# Patient Record
Sex: MALE | Race: WHITE | NOT HISPANIC OR LATINO | Employment: FULL TIME | URBAN - METROPOLITAN AREA
[De-identification: names, ages, dates, MRNs, and addresses within clinical notes are randomized per-mention and may not be internally consistent; named-entity substitution may affect disease eponyms.]

---

## 2021-02-26 ENCOUNTER — TELEPHONE (OUTPATIENT)
Dept: NEPHROLOGY | Facility: CLINIC | Age: 81
End: 2021-02-26

## 2021-04-01 ENCOUNTER — TELEPHONE (OUTPATIENT)
Dept: NEPHROLOGY | Facility: CLINIC | Age: 81
End: 2021-04-01

## 2021-10-14 ENCOUNTER — TELEPHONE (OUTPATIENT)
Dept: VASCULAR SURGERY | Facility: CLINIC | Age: 81
End: 2021-10-14

## 2022-02-23 ENCOUNTER — TELEPHONE (OUTPATIENT)
Dept: HEPATOLOGY | Facility: CLINIC | Age: 82
End: 2022-02-23
Payer: MEDICARE

## 2022-02-23 ENCOUNTER — OFFICE VISIT (OUTPATIENT)
Dept: HEPATOLOGY | Facility: CLINIC | Age: 82
End: 2022-02-23
Payer: MEDICARE

## 2022-02-23 VITALS
TEMPERATURE: 96 F | SYSTOLIC BLOOD PRESSURE: 170 MMHG | WEIGHT: 214.06 LBS | OXYGEN SATURATION: 96 % | DIASTOLIC BLOOD PRESSURE: 74 MMHG | HEART RATE: 68 BPM | RESPIRATION RATE: 18 BRPM | HEIGHT: 69 IN | BODY MASS INDEX: 31.71 KG/M2

## 2022-02-23 DIAGNOSIS — I35.0 SEVERE AORTIC STENOSIS: ICD-10-CM

## 2022-02-23 DIAGNOSIS — I25.10 CORONARY ARTERY DISEASE INVOLVING NATIVE CORONARY ARTERY OF NATIVE HEART WITHOUT ANGINA PECTORIS: ICD-10-CM

## 2022-02-23 DIAGNOSIS — I50.22 CHRONIC SYSTOLIC CONGESTIVE HEART FAILURE: ICD-10-CM

## 2022-02-23 DIAGNOSIS — I27.20 PULMONARY HYPERTENSION: ICD-10-CM

## 2022-02-23 DIAGNOSIS — R18.8 OTHER ASCITES: Primary | ICD-10-CM

## 2022-02-23 PROCEDURE — 99214 OFFICE O/P EST MOD 30 MIN: CPT | Mod: PBBFAC | Performed by: INTERNAL MEDICINE

## 2022-02-23 PROCEDURE — 99205 PR OFFICE/OUTPT VISIT, NEW, LEVL V, 60-74 MIN: ICD-10-PCS | Mod: S$PBB,,, | Performed by: INTERNAL MEDICINE

## 2022-02-23 PROCEDURE — 99999 PR PBB SHADOW E&M-EST. PATIENT-LVL IV: CPT | Mod: PBBFAC,,, | Performed by: INTERNAL MEDICINE

## 2022-02-23 PROCEDURE — 99999 PR PBB SHADOW E&M-EST. PATIENT-LVL IV: ICD-10-PCS | Mod: PBBFAC,,, | Performed by: INTERNAL MEDICINE

## 2022-02-23 PROCEDURE — 99205 OFFICE O/P NEW HI 60 MIN: CPT | Mod: S$PBB,,, | Performed by: INTERNAL MEDICINE

## 2022-02-23 RX ORDER — ERGOCALCIFEROL 1.25 MG/1
50000 CAPSULE ORAL
COMMUNITY
Start: 2022-02-21 | End: 2022-04-12

## 2022-02-23 RX ORDER — HYDROCODONE BITARTRATE AND ACETAMINOPHEN 5; 325 MG/1; MG/1
1 TABLET ORAL DAILY PRN
COMMUNITY
Start: 2022-02-22

## 2022-02-23 RX ORDER — CALCITRIOL 0.25 UG/1
CAPSULE ORAL
COMMUNITY
Start: 2022-02-21

## 2022-02-23 RX ORDER — SPIRONOLACTONE 25 MG/1
25 TABLET ORAL DAILY
COMMUNITY
Start: 2022-01-10

## 2022-02-23 NOTE — TELEPHONE ENCOUNTER
I spoke with patient and his wife.  Appt with Dr. Chu scheduled 2/28/22; appt reminder notice mailed.

## 2022-02-23 NOTE — TELEPHONE ENCOUNTER
----- Message from Marsha Evans sent at 2/23/2022  9:29 AM CST -----  Good morning,    The pt listed above is being referred by Dr. Ramón Whitt for ascites. Unfortunately, I was unable to schedule pts appt because no appts populated.I have scanned the referral and records into . Please contact Mr. King at your earliest convenience to schedule his appt.          Thank You,  Marsha Cates

## 2022-02-23 NOTE — PROGRESS NOTES
Subjective:       Patient ID: Jarod King is a 81 y.o. male.    Chief Complaint: No chief complaint on file.    HPI  I saw this 81 y.o. man who has a significant cardiac history (since 2013) and who developed ascites and edema in Nov 2021.  He has required paracentesis on a few occasions since then (about 4 liters of ascites each time).    He is on low dose diuretics and has been unable to tolerate higher doses because of worsening kidney function.  He has also noticed some muscle loss.    No episodes of hepatic encephalopathy.    I calculated his SAAG (serum to ascites albumin gradient) from the available labs he had a while back and although I couldn't find a fluid albumin and serum albumin done on the same day, it is likely that his SAAG is around 1.2 which suggests portal hypertension or a cardiac cause of his ascites.      Latest labs show hyponatremia, a creatinine of 1.82  Normal lFTs  Platelets 93    HBV and HCV neg    Overall feels well    Furosemide 40mg  [place] 25 mg daily  - couldn't tolerate a dose increase    PMH:  Congestive heart failure- LVEF 30-35%  Pulmonary hypertension- mean PAP 42 mm Hg  Severe aortic stenosis- significant gradient  CAD- diffuse coronary artery disease  ICD  Paroxysmal AF  COVID 2020- very ill    SH:  Non smoker  Alcohol rarely        Review of Systems   Constitutional: Negative for activity change, appetite change, chills, fatigue, fever and unexpected weight change.   HENT: Negative for hearing loss.    Eyes: Negative for discharge and visual disturbance.   Respiratory: Negative for cough, chest tightness, shortness of breath and wheezing.    Cardiovascular: Negative for chest pain, palpitations and leg swelling.   Gastrointestinal: Negative for abdominal distention, abdominal pain, constipation, diarrhea and nausea.   Genitourinary: Negative for dysuria and frequency.   Musculoskeletal: Negative for arthralgias and back pain.   Skin: Negative for pallor and  rash.   Neurological: Negative for dizziness, tremors, speech difficulty and headaches.   Hematological: Negative for adenopathy.   Psychiatric/Behavioral: Negative for agitation and confusion.         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     Chronic renal failure    Nonrheumatic aortic (valve) stenosis     Aortic valve disorders    Other hyperlipidemia     Hyperlipidemia    Paroxysmal atrial fibrillation     Atrial fibrillation    Pulmonary hypertension      Past Surgical History:   Procedure Laterality Date    EPIDURAL BLOCK INJECTION      INSERTION OF GENERATOR FOR SINGLE CHAMBER IMPLANTABLE CARDIOVERTER-DEFIBRILLATOR (ICD)      Biotronik    LITHOTRIPSY      [place]S  11/[place]    [facility]       Current Outpatient Medications   Medication Sig    amiodarone (PACERONE) 200 MG Tab Take 200 mg by mouth.    ELIQUIS 5 mg Tab     ergocalciferol (ERGOCALCIFEROL) 50,000 unit Cap Take 50,000 Units by mouth.    furosemide (LASIX) 40 MG tablet Take 40 mg by mouth.    hydrALAZINE (APRESOLINE) 50 MG tablet Take 50 mg by mouth 2 (two) times a day.    HYDROcodone-acetaminophen (NORCO) 5-325 mg per tablet Take 1 tablet by mouth daily as needed.    levothyroxine (SYNTHROID) 50 MCG tablet TAKE 1 TABLET BY MOUTH IN THE EARLY MORNING 30 - 60 MINUTES BEFORE BREAKFAST OR OTHER MEDS    losartan (COZAAR) 50 MG tablet     sildenafil (REVATIO) 20 mg Tab Take 1 tablet (20 mg total) by mouth 3 (three) times daily.    spironolactone (ALDACTONE) 25 MG tablet Take 25 mg by mouth once daily.    calcitRIOL (ROCALTROL) 0.25 MCG Cap      No current facility-administered medications for this visit.       Objective:      Physical Exam  HENT:      Head: Normocephalic.    Eyes:      Pupils: Pupils are equal, round, and reactive to light.   Neck:      Thyroid: No thyromegaly.   Cardiovascular:      Rate and Rhythm: Normal rate and regular rhythm.      Heart sounds: Normal heart sounds.   Pulmonary:      Effort: Pulmonary effort is normal.      Breath sounds: Normal breath sounds. No wheezing.   Abdominal:      Palpations: Abdomen is soft. There is no mass.      Tenderness: There is no abdominal tenderness.   Musculoskeletal:      Right lower leg: Edema present.      Left lower leg: Edema present.      Comments: Significant edema to both knees   Lymphadenopathy:      Cervical: No cervical adenopathy.   Skin:     General: Skin is warm.      Findings: No erythema or rash.   Neurological:      Mental Status: He is alert and oriented to person, place, and time.   Psychiatric:         Behavior: Behavior normal.           Assessment:       1. Other ascites    2. Chronic systolic congestive heart failure    3. Pulmonary hypertension    4. Severe aortic stenosis    5. Coronary artery disease involving native coronary artery of native heart without angina pectoris        Plan:   I suspect that the cause of his ascites is mainly cardiac disease sav right heart failure.  However, he does have low platelets and the chronicity of his cardiac disease makes it possible that he has also developed  Liver fibrosis from chronic congestion.    I explained to  and Mrs King that the treatment for his ascites is symptomatic and will likely consist of regular paracentesis and low dose diuretics..  I will proceed with a liver US to image his liver but the only way to make a definitve diagnosis would be with a liver biopsy which I did not propose since I do not think it would alter our management.    If advanced cardiac options are being considered, the cardiologists may wish to define his liver disease more accurately and at that point we can arrange a liver biopsy.    Clinic in 4 weeks.      Low salt  renny  Saw Dr Pearson but Dr Vanegas is his regular cardiologist

## 2022-02-23 NOTE — TELEPHONE ENCOUNTER
Patient's wife called back stating that he would like to come to clinic today.  Patient scheduled to see Dr. Kingsley today at 2:30 pm.  Appt with Dr. Chu cancelled on 2/28/22.

## 2022-03-02 ENCOUNTER — HOSPITAL ENCOUNTER (OUTPATIENT)
Dept: RADIOLOGY | Facility: HOSPITAL | Age: 82
Discharge: HOME | End: 2022-03-02
Attending: INTERNAL MEDICINE
Payer: MEDICARE

## 2022-03-02 DIAGNOSIS — R18.8 OTHER ASCITES: ICD-10-CM

## 2022-03-02 PROCEDURE — 93976 US LIVER WITH DOPPLER: ICD-10-PCS | Mod: 26,,, | Performed by: RADIOLOGY

## 2022-03-02 PROCEDURE — 76705 US LIVER WITH DOPPLER: ICD-10-PCS | Mod: 26,XS,, | Performed by: RADIOLOGY

## 2022-03-02 PROCEDURE — 76705 ECHO EXAM OF ABDOMEN: CPT | Mod: 26,XS,, | Performed by: RADIOLOGY

## 2022-03-02 PROCEDURE — 76705 ECHO EXAM OF ABDOMEN: CPT | Mod: TC,PO

## 2022-03-02 PROCEDURE — 93976 VASCULAR STUDY: CPT | Mod: TC,PO

## 2022-03-02 PROCEDURE — 93976 VASCULAR STUDY: CPT | Mod: 26,,, | Performed by: RADIOLOGY

## 2022-03-23 ENCOUNTER — OFFICE VISIT (OUTPATIENT)
Dept: HEPATOLOGY | Facility: CLINIC | Age: 82
End: 2022-03-23
Payer: MEDICARE

## 2022-03-23 VITALS
DIASTOLIC BLOOD PRESSURE: 75 MMHG | SYSTOLIC BLOOD PRESSURE: 157 MMHG | HEIGHT: 69 IN | TEMPERATURE: 99 F | WEIGHT: 205.5 LBS | BODY MASS INDEX: 30.44 KG/M2 | HEART RATE: 67 BPM | RESPIRATION RATE: 18 BRPM | OXYGEN SATURATION: 100 %

## 2022-03-23 DIAGNOSIS — I27.20 PULMONARY HYPERTENSION: ICD-10-CM

## 2022-03-23 DIAGNOSIS — I35.0 SEVERE AORTIC STENOSIS: Primary | ICD-10-CM

## 2022-03-23 DIAGNOSIS — I25.10 CORONARY ARTERY DISEASE INVOLVING NATIVE CORONARY ARTERY OF NATIVE HEART WITHOUT ANGINA PECTORIS: ICD-10-CM

## 2022-03-23 DIAGNOSIS — R18.8 OTHER ASCITES: ICD-10-CM

## 2022-03-23 PROCEDURE — 99215 OFFICE O/P EST HI 40 MIN: CPT | Mod: S$PBB,,, | Performed by: INTERNAL MEDICINE

## 2022-03-23 PROCEDURE — 99214 OFFICE O/P EST MOD 30 MIN: CPT | Mod: PBBFAC | Performed by: INTERNAL MEDICINE

## 2022-03-23 PROCEDURE — 99215 PR OFFICE/OUTPT VISIT, EST, LEVL V, 40-54 MIN: ICD-10-PCS | Mod: S$PBB,,, | Performed by: INTERNAL MEDICINE

## 2022-03-23 PROCEDURE — 99999 PR PBB SHADOW E&M-EST. PATIENT-LVL IV: ICD-10-PCS | Mod: PBBFAC,,, | Performed by: INTERNAL MEDICINE

## 2022-03-23 PROCEDURE — 99999 PR PBB SHADOW E&M-EST. PATIENT-LVL IV: CPT | Mod: PBBFAC,,, | Performed by: INTERNAL MEDICINE

## 2022-03-23 RX ORDER — METOPROLOL SUCCINATE 25 MG/1
12.5 TABLET, EXTENDED RELEASE ORAL
COMMUNITY

## 2022-03-23 NOTE — PROGRESS NOTES
Subjective:       Patient ID: Jarod King is a 81 y.o. male.    Chief Complaint: No chief complaint on file.    HPI  I saw this 81 y.o. man who has a significant cardiac history (since 2013) and who developed ascites and edema in Nov 2021.  He now requires paracentesis every 2 weeks (about 4 liters of ascites each time).    He is on low dose diuretics and has been unable to tolerate higher doses because of worsening kidney function. Doubling of his diuretic doses has not helped and has resulted in a significant GFR decrease.    He has also noticed some muscle loss.    No episodes of hepatic encephalopathy.    I calculated his SAAG (serum to ascites albumin gradient) from the available labs he had a while back and although I couldn't find a fluid albumin and serum albumin done on the same day, it is likely that his SAAG is around 1.2 which suggests portal hypertension or a cardiac cause of his ascites.      Latest labs show hyponatremia, a creatinine of 1.82  Normal lFTs  Platelets 93    HBV and HCV neg    Overall feels well    Furosemide 40mg  [place] 25 mg daily  - couldn't tolerate a dose increase    Abdo US: 3/2/2022  The liver measures 18.8cm.  Hepatic parenchyma is homogeneous without evidence for mass.  2 cm stone present with sludge.  No wall thickening or pericholecystic fluid.  The common bile duct measures 0.3 cm. No intrahepatic biliary ductal dilatation.  Color Doppler images demonstrate appropriate flow in the main portal vein and its branches, all 3 hepatic veins, the SMV, and the IVC.  The main hepatic artery is patent without tardus parvus waveform.  Hepatic artery velocities in cm/sec and resistive indices are as follows:  Main hepatic artery: 63.5, RI: 0.73  Left hepatic artery: 54, RI: 0.78  Right hepatic artery, anterior: 56, RI: 0.65; possible tardus parvus waveform.  Right hepatic artery, posterior: 23, RI: 0.9; tardus parvus waveform  Small volume ascites present    PMH:  Congestive heart  failure- LVEF 30-35%  Pulmonary hypertension- mean PAP 42 mm Hg  Severe aortic stenosis- significant gradient  CAD- diffuse coronary artery disease  ICD  Paroxysmal AF  COVID 2020- very ill    SH:  Non smoker  Alcohol rarely        Review of Systems   Constitutional: Negative for activity change, appetite change, chills, fatigue, fever and unexpected weight change.   HENT: Negative for hearing loss.    Eyes: Negative for discharge and visual disturbance.   Respiratory: Negative for cough, chest tightness, shortness of breath and wheezing.    Cardiovascular: Negative for chest pain, palpitations and leg swelling.   Gastrointestinal: Negative for abdominal distention, abdominal pain, constipation, diarrhea and nausea.   Genitourinary: Negative for dysuria and frequency.   Musculoskeletal: Negative for arthralgias and back pain.   Skin: Negative for pallor and rash.   Neurological: Negative for dizziness, tremors, speech difficulty and headaches.   Hematological: Negative for adenopathy.   Psychiatric/Behavioral: Negative for agitation and confusion.         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     Chronic renal failure    Nonrheumatic aortic (valve) stenosis     Aortic valve disorders    Other hyperlipidemia     Hyperlipidemia    Paroxysmal atrial fibrillation     Atrial fibrillation    Pulmonary hypertension      Past Surgical History:   Procedure Laterality Date    EPIDURAL BLOCK INJECTION      INSERTION OF GENERATOR FOR SINGLE CHAMBER IMPLANTABLE CARDIOVERTER-DEFIBRILLATOR (ICD)      [employer]ron[place]    LITHOTRIPSY      [place] RT/[place] HEART CATHETERS  11/2021    [place]    [facility]       Current Outpatient Medications   Medication Sig    amiodarone (PACERONE)  200 MG Tab Take 200 mg by mouth.    calcitRIOL (ROCALTROL) 0.25 MCG Cap     ELIQUIS 5 mg Tab     ergocalciferol (ERGOCALCIFEROL) 50,000 unit Cap Take 50,000 Units by mouth.    furosemide (LASIX) 40 MG tablet Take 40 mg by mouth.    hydrALAZINE (APRESOLINE) 50 MG tablet Take 50 mg by mouth 2 (two) times a day.    HYDROcodone-acetaminophen (NORCO) 5-325 mg per tablet Take 1 tablet by mouth daily as needed.    levothyroxine (SYNTHROID) 50 MCG tablet TAKE 1 TABLET BY MOUTH IN THE EARLY MORNING 30 - 60 MINUTES BEFORE BREAKFAST OR OTHER MEDS    losartan (COZAAR) 50 MG tablet     metoprolol succinate (TOPROL-XL) 25 MG 24 hr tablet Take 12.5 mg by mouth.    sildenafil (REVATIO) 20 mg Tab Take 1 tablet (20 mg total) by mouth 3 (three) times daily.    spironolactone (ALDACTONE) 25 MG tablet Take 25 mg by mouth once daily.     No current facility-administered medications for this visit.       Objective:      Physical Exam  HENT:      Head: Normocephalic.   Eyes:      Pupils: Pupils are equal, round, and reactive to light.   Neck:      Thyroid: No thyromegaly.   Cardiovascular:      Rate and Rhythm: Normal rate and regular rhythm.      Heart sounds: Normal heart sounds.   Pulmonary:      Effort: Pulmonary effort is normal.      Breath sounds: Normal breath sounds. No wheezing.   Abdominal:      General: There is distension.      Palpations: Abdomen is soft. There is no mass.      Tenderness: There is no abdominal tenderness.   Musculoskeletal:      Right lower leg: Edema present.      Left lower leg: Edema present.      Comments: Significant edema to both knees   Lymphadenopathy:      Cervical: No cervical adenopathy.   Skin:     General: Skin is warm.      Findings: No erythema or rash.   Neurological:      Mental Status: He is alert and oriented to person, place, and time.   Psychiatric:         Behavior: Behavior normal.           Assessment:       1. Severe aortic stenosis    2. Pulmonary hypertension    3.  Coronary artery disease involving native coronary artery of native heart without angina pectoris    4. Other ascites        Plan:   I suspect that the cause of his ascites is mainly cardiac disease sav right heart failure.  However, he does have low platelets and the chronicity of his cardiac disease makes it possible that he has also developed  Liver fibrosis from chronic congestion.    I explained to Mr and Mrs King that the treatment for his ascites is symptomatic and will likely consist of regular paracentesis and low dose diuretics..    If advanced cardiac options are being considered, the cardiologists may wish to define his liver disease more accurately and at that point we can arrange a liver biopsy. I understand that he is being considered for a TAVR by Dr Montenegro at [facility] in .    I asked him to call me and let me know what the decision is re any cardiac interventions. In the meantime, I think he ought to have an EGD to check for varices which will indirectly help us with the diagnosis of cirrhosis. He does have thrombocytopenia but I note that his US showed a large liver rather than a shrunken cirrhotic liver.    Depending on his next cardiac appointment, I may refer him to the cardiac failure team here to treat his pulmonary hypertension.